# Patient Record
(demographics unavailable — no encounter records)

---

## 2025-01-27 NOTE — ASSESSMENT
[FreeTextEntry1] : 68-year-old female with a 3.0 cm cystic lesion at the pancreatic head containing internal septation possible nodularity with no ductal dilatation seen on recent MRI. Reviewed and discussed imaging report.  We discussed the recommendations for endoscopic ultrasound.   We discussed the proposed procedure, preparation and alternatives.  The risks (bleeding, perforation, infection, pancreatitis) and benefits were discussed with the patient in details.  The patient understands the risks/benefits and agreed to proceed with procedure.  Patient aware she will need Cardiac clearance prior to procedure. Dr Mondragon - [phone]. 2/24/2025    Plan 1.  EUS with Dr. Black on 3/6/2025 2.  Instructions emailed  All questions answered.  Patient to call me with any questions.

## 2025-01-27 NOTE — HISTORY OF PRESENT ILLNESS
[FreeTextEntry1] : 1/15/2025 Pancreas: 3.0 cm multiloculated cystic lesion at the pancreatic head containing internal septation and possible mild internal nodularity with evaluation with internal enhancement limited by patient's motion artifact sequences.  There is no main pancreatic ductal dilatation.

## 2025-03-13 NOTE — REVIEW OF SYSTEMS
Physical Therapy Visit    Visit Type: Daily Treatment Note  Visit: 11  Referring Provider: Mary Adan NP  Medical Diagnosis (from order): Diagnosis Information    Diagnosis  M25.511, M25.512 (ICD-10-CM) - Acute pain of both shoulders  M67.911 (ICD-10-CM) - Disorder of right rotator cuff         SUBJECTIVE                                                                                                               PT STATES HER PAIN  PUT HER ON A MM RELAXER WHICH SEEMS TO BE HELPING. SHE IS SCHEDULED FOR A CERVICAL NERVE BLOCK TODAY. SHE FEELS SHE CAN LIFT HER ARM HIGHER THAN SHE COULD BEFORE. SHE HAS BEEN HAVING INCREASED TINNITUS IN BOTH EARS LAST FEW DAYS AS WELL.    Pain / Symptoms  - Pain rating (out of 10): Current: 6 ; Best: 4; Worst: 9      OBJECTIVE                                                                                                                                       Treatment     Therapeutic Exercise  Seated:  Active cervical flex x 10  Active cervical  RR/RL x 10  scap retract x 10  Standing:  Shoulder shrugs x 10  Biceps curls x 10  Standing Roll ball on table x 10  Active shoulder IR/ER standing  Manual gentle isometrics rotator cuff arm held in neutral elbow flexed x 6 ea  Slide arm FWon ll bar x10 ea    Manual Therapy   MANUAL THERAPY INCLUDING STM  UTILIZED TO ADDRESS PAIN, AND MOBILITY ISSUES B GHJ, CERVICAL SPINE AND SHOULDER GIRDLE SOFT TISSUE    Therapeutic Activity  PT EDUCATED IN DIAGNOSIS AND EXPECTED REHAB OUTCOMES    Skilled input: verbal instruction/cues and tactile instruction/cues    Writer verbally educated and received verbal consent for hand placement, positioning of patient, and techniques to be performed today from patient for clothing adjustments for techniques, therapist position for techniques and hand placement and palpation for techniques as described above and how they are pertinent to the patient's plan of care.      ASSESSMENT                                                                                                             PT REPORTS SHE FEELS SHE CAN MOVE BOTH ARMS A LITTLE EASIER INCLUDING FOR SELF CARE TASKS LIKE DON/DOFF COAT. SHE IS SCHEDULED FOR CERVICAL INJECTION TODAY AND NEXT WEEK TRIGGER POINT INJECTIONS  Education:   - Results of above outlined education: Verbalizes understanding and Demonstrates understanding    PLAN                                                                                                                           Suggestions for next session as indicated: Progress per plan of care       Therapy procedure time and total treatment time can be found documented on the Time Entry flowsheet     [Dyspnea on exertion] : dyspnea during exertion [Negative] : Heme/Lymph

## 2025-03-14 NOTE — REASON FOR VISIT
[Arrhythmia/ECG Abnorrmalities] : arrhythmia/ECG abnormalities [FreeTextEntry3] : Segundo Landeros MD

## 2025-03-14 NOTE — HISTORY OF PRESENT ILLNESS
Okay to hold alendronate for now until in more structured environment   [FreeTextEntry1] : Ronna Yanez is a 61y/o woman with Hx of HLD, osteoarthritis, DM (diet controlled), palpitations and PVCs who presents today for routine f/u. On prior visit, Stress test on 2/27/23 showed exercise induced ventricular tachycardia of at least two different morphologies. Holter x5 days showed PVC s and NSVT. She completed genetic testing, mainly to check for CPVT since her VT was of two different morphologies, but only showed variants of uncertain significance. Cardiac MRI showed no evidence of scar, but did show MVP, although echocardiogram did not show MVP.  Since that time, she has remained on metoprolol. She has been doing well. Does have occasional dyspnea with stairs, not new or changed. She can walk > 10 blocks without dyspnea. Reports he was started on glipizide for DM. she had an EGD and an episode of brief episode racing after. She states she no longer feels the: PVC's after starting metoprolol 25 mg daily. States the PVC's had started in 2020 during Covid when wearing the masks in the hospital. 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

## 2025-03-14 NOTE — HISTORY OF PRESENT ILLNESS
[FreeTextEntry1] : Ronna Yanez is a 59y/o woman with Hx of HLD, osteoarthritis, DM (diet controlled), palpitations and PVCs who presents today for routine f/u. On prior visit, Stress test on 2/27/23 showed exercise induced ventricular tachycardia of at least two different morphologies. Holter x5 days showed PVC s and NSVT. She completed genetic testing, mainly to check for CPVT since her VT was of two different morphologies, but only showed variants of uncertain significance. Cardiac MRI showed no evidence of scar, but did show MVP, although echocardiogram did not show MVP.  Since that time, she has remained on metoprolol. She has been doing well. Does have occasional dyspnea with stairs, not new or changed. She can walk > 10 blocks without dyspnea. Reports he was started on glipizide for DM. she had an EGD and an episode of brief episode racing after. She states she no longer feels the: PVC's after starting metoprolol 25 mg daily. States the PVC's had started in 2020 during Covid when wearing the masks in the hospital. 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

## 2025-03-14 NOTE — CARDIOLOGY SUMMARY
[de-identified] : 3/14/25 NSR 65, PRWP [de-identified] : 3/18/2024 Conclusions:  1. The patient underwent stress testing using the standard Julius protocol.     _ The patient exercised for 3 min 46 sec.     _ The test was stopped due to shortness of breath and palpitations.     _ The peak heart rate was 146 bpm; 91 % of predicted maximal heart rate for this patient.     _ The patient achieved 5.5 METS which is consistent with fair exercise capacity.  2. Stress electrocardiogram: No ischemic ST segment changes.  3. Arrhythmias: Frequent VPD's occurred during stress and recovery, increased with stress. During stress and recovery, there were frequent PVCs, often in pattern of bigeminy. There were up to 6-beat run VT.  4. The Duke Treadmill Score is 3.00, which is consistent with intermediate risk (-10 to <5) for future cardiac events. [de-identified] : 2/26/2021: CORONARY VESSELS: The coronary circulation is right dominant.\par  LM:   --  LM: Normal.\par  LAD:   --  LAD: Normal.\par  --  D1: Normal.\par  CX:   --  Circumflex: Normal.\par  --  OM1: Normal.\par  --  OM2: Normal.\par  RCA:   --  RCA: Normal.\par

## 2025-03-14 NOTE — DISCUSSION/SUMMARY
[EKG obtained to assist in diagnosis and management of assessed problem(s)] : EKG obtained to assist in diagnosis and management of assessed problem(s) [FreeTextEntry1] : IMPRESSION:  1. PVC/NSVT: EKG performed today to assess for presence of PVCs and reveals NSR. Cardiac MRI without LGE and normal LVEF. Denies feeling PVCs at this time. May decrease metoprolol to 12.5 mg per day and wean to off if no palpitations. If palpitations resume at 25 mg per day.   2. HLD: resume statin therapy as prescribed and regular f/u with Cardiologist for routine lipid monitoring and management.  Continue f/u with Cardiologist and RTO for f/u in 6 months.

## 2025-03-14 NOTE — ADDENDUM
[FreeTextEntry1] : Offered patient ablation of the 2 morphologies of VT, but she'd prefer to stay with beta blockers for now given that she feels better while taking metoprolol.  No VT on event monitor.

## 2025-03-14 NOTE — CARDIOLOGY SUMMARY
[de-identified] : 3/14/25 NSR 65, PRWP [de-identified] : 3/18/2024 Conclusions:  1. The patient underwent stress testing using the standard Julius protocol.     _ The patient exercised for 3 min 46 sec.     _ The test was stopped due to shortness of breath and palpitations.     _ The peak heart rate was 146 bpm; 91 % of predicted maximal heart rate for this patient.     _ The patient achieved 5.5 METS which is consistent with fair exercise capacity.  2. Stress electrocardiogram: No ischemic ST segment changes.  3. Arrhythmias: Frequent VPD's occurred during stress and recovery, increased with stress. During stress and recovery, there were frequent PVCs, often in pattern of bigeminy. There were up to 6-beat run VT.  4. The Duke Treadmill Score is 3.00, which is consistent with intermediate risk (-10 to <5) for future cardiac events. [de-identified] : 2/26/2021: CORONARY VESSELS: The coronary circulation is right dominant.\par  LM:   --  LM: Normal.\par  LAD:   --  LAD: Normal.\par  --  D1: Normal.\par  CX:   --  Circumflex: Normal.\par  --  OM1: Normal.\par  --  OM2: Normal.\par  RCA:   --  RCA: Normal.\par

## 2025-06-11 NOTE — DISCUSSION/SUMMARY
[de-identified] : Ronna is a right cervical radiculopathy.  She had an MRI demonstrating severe right foraminal stenosis at C5-C6 and C6-C7 which correlates with the areas where she is feeling pain.  Her shoulder testing is relatively benign and I recommend that she proceed with a cervical epidural steroid injection.  She is a diabetic and deferred Medrol Dosepak which was offered to her today.  She also deferred anti-inflammatories and will continue with Tylenol for the time being.  I will see her back if she has persistent shoulder pain after the epidural steroid injections.  All of her questions were answered she is in agreement with this plan.

## 2025-06-11 NOTE — DISCUSSION/SUMMARY
[de-identified] : Ronna is a right cervical radiculopathy.  She had an MRI demonstrating severe right foraminal stenosis at C5-C6 and C6-C7 which correlates with the areas where she is feeling pain.  Her shoulder testing is relatively benign and I recommend that she proceed with a cervical epidural steroid injection.  She is a diabetic and deferred Medrol Dosepak which was offered to her today.  She also deferred anti-inflammatories and will continue with Tylenol for the time being.  I will see her back if she has persistent shoulder pain after the epidural steroid injections.  All of her questions were answered she is in agreement with this plan.

## 2025-06-11 NOTE — IMAGING
[de-identified] : Cervical spine exam:  Range of motion  Full range of motion without limitations  Strength exam  Right upper extremity: Deltoid 5/5 Bicep 5/5 Tricep 5/5 Wrist extension 5/5 Wrist flexion 5/5 Intrinsics 5/5  Left upper extremity: Deltoid 5/5 Bicep 5/5 Tricep 5/5 Wrist extension 5/5 Wrist flexion 5/5 Intrinsics 5/5  Sensation exam: Sensation intact C5-T1 bilaterally  Negative Spurling's Negative Tony's   R Shoulder: Swelling: none Deformity: none Atrophy: none Scars: none Scapular Winging: negative   Tenderness: none   ROM: Flexion: Passive: 160, Active: 160 Abduction: Passive: 110, Active: 110 Ext Rotation at 0: Passive: 90, Active: 90 Ext Rotation at 90: Passive: 90, Active: 90 Internal rotation at 90: Passive: 90, Active: 90   Strength: Supra - 5/5 Infra - 5/5 Subscap - 5/5 Teres - 5/5   Signs: Neer: pos Antonio: pos Jobes: neg for weakness Arm Drop: neg Hornblower: neg Speeds: pos Belly Press: neg Lift Off: neg Spurlings: neg   Motor and sensation are grossly intact in all distributions

## 2025-06-11 NOTE — HISTORY OF PRESENT ILLNESS
[de-identified] : 06/11/2025: 61-year F presenting for evaluation of right shoulder pain. Onset: 4/2025. No specific injury. Pain in right shoulder into armpit down to wrist, denies N/T. Limited ROM. Saw pain mgmt, had C Spine MRI & b/l shoulder xr's @[facility]; was started on PT for neck and shoulder. Completed 10 sessions with minimal benefit. Referred here for shoulder. Lilian Tramadol, helps.   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

## 2025-06-11 NOTE — HISTORY OF PRESENT ILLNESS
[de-identified] : 06/11/2025: 61-year F presenting for evaluation of right shoulder pain. Onset: 4/2025. No specific injury. Pain in right shoulder into armpit down to wrist, denies N/T. Limited ROM. Saw pain mgmt, had C Spine MRI & b/l shoulder xr's @[facility]; was started on PT for neck and shoulder. Completed 10 sessions with minimal benefit. Referred here for shoulder. Lilian Tramadol, helps.   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

## 2025-06-11 NOTE — IMAGING
[de-identified] : Cervical spine exam:  Range of motion  Full range of motion without limitations  Strength exam  Right upper extremity: Deltoid 5/5 Bicep 5/5 Tricep 5/5 Wrist extension 5/5 Wrist flexion 5/5 Intrinsics 5/5  Left upper extremity: Deltoid 5/5 Bicep 5/5 Tricep 5/5 Wrist extension 5/5 Wrist flexion 5/5 Intrinsics 5/5  Sensation exam: Sensation intact C5-T1 bilaterally  Negative Spurling's Negative Tony's   R Shoulder: Swelling: none Deformity: none Atrophy: none Scars: none Scapular Winging: negative   Tenderness: none   ROM: Flexion: Passive: 160, Active: 160 Abduction: Passive: 110, Active: 110 Ext Rotation at 0: Passive: 90, Active: 90 Ext Rotation at 90: Passive: 90, Active: 90 Internal rotation at 90: Passive: 90, Active: 90   Strength: Supra - 5/5 Infra - 5/5 Subscap - 5/5 Teres - 5/5   Signs: Neer: pos Antonio: pos Jobes: neg for weakness Arm Drop: neg Hornblower: neg Speeds: pos Belly Press: neg Lift Off: neg Spurlings: neg   Motor and sensation are grossly intact in all distributions

## 2025-06-11 NOTE — DATA REVIEWED
[Outside X-rays] : outside x-rays [Right] : of the right [Shoulder] : shoulder [MRI] : MRI [Cervical Spine] : cervical spine [I independently reviewed and interpreted images and report] : I independently reviewed and interpreted images and report